# Patient Record
Sex: FEMALE | Race: WHITE | Employment: OTHER | URBAN - NONMETROPOLITAN AREA
[De-identification: names, ages, dates, MRNs, and addresses within clinical notes are randomized per-mention and may not be internally consistent; named-entity substitution may affect disease eponyms.]

---

## 2018-12-27 ENCOUNTER — OFFICE VISIT (OUTPATIENT)
Dept: FAMILY MEDICINE CLINIC | Age: 58
End: 2018-12-27
Payer: COMMERCIAL

## 2018-12-27 VITALS
TEMPERATURE: 98.4 F | RESPIRATION RATE: 18 BRPM | SYSTOLIC BLOOD PRESSURE: 124 MMHG | OXYGEN SATURATION: 94 % | HEART RATE: 82 BPM | BODY MASS INDEX: 32.85 KG/M2 | WEIGHT: 185.4 LBS | DIASTOLIC BLOOD PRESSURE: 72 MMHG | HEIGHT: 63 IN

## 2018-12-27 DIAGNOSIS — J02.0 STREP THROAT: Primary | ICD-10-CM

## 2018-12-27 LAB — STREPTOCOCCUS A RNA: NORMAL

## 2018-12-27 PROCEDURE — 87651 STREP A DNA AMP PROBE: CPT | Performed by: NURSE PRACTITIONER

## 2018-12-27 PROCEDURE — 99203 OFFICE O/P NEW LOW 30 MIN: CPT | Performed by: NURSE PRACTITIONER

## 2018-12-27 RX ORDER — DIPHENOXYLATE HYDROCHLORIDE AND ATROPINE SULFATE 2.5; .025 MG/1; MG/1
TABLET ORAL
COMMUNITY
Start: 2018-10-05

## 2018-12-27 RX ORDER — BUTALBITAL, ACETAMINOPHEN AND CAFFEINE 50; 325; 40 MG/1; MG/1; MG/1
TABLET ORAL
COMMUNITY
Start: 2018-10-05

## 2018-12-27 RX ORDER — AZITHROMYCIN 500 MG/1
500 TABLET, FILM COATED ORAL DAILY
Qty: 5 TABLET | Refills: 0 | Status: SHIPPED | OUTPATIENT
Start: 2018-12-27 | End: 2019-01-01

## 2018-12-27 ASSESSMENT — PATIENT HEALTH QUESTIONNAIRE - PHQ9: DEPRESSION UNABLE TO ASSESS: PT REFUSES

## 2019-01-01 ASSESSMENT — ENCOUNTER SYMPTOMS
SORE THROAT: 1
GASTROINTESTINAL NEGATIVE: 1
SHORTNESS OF BREATH: 0
COUGH: 0
EYES NEGATIVE: 1

## 2021-11-24 ENCOUNTER — HOSPITAL ENCOUNTER (EMERGENCY)
Age: 61
Discharge: HOME | End: 2021-11-25
Attending: EMERGENCY MEDICINE
Payer: COMMERCIAL

## 2021-11-24 DIAGNOSIS — U07.1 COVID-19 VIRUS INFECTION: Primary | ICD-10-CM

## 2021-11-24 PROCEDURE — 99283 EMERGENCY DEPT VISIT LOW MDM: CPT

## 2021-11-24 RX ORDER — PANTOPRAZOLE SODIUM 40 MG/1
40 GRANULE, DELAYED RELEASE ORAL
COMMUNITY

## 2021-11-24 ASSESSMENT — PAIN DESCRIPTION - FREQUENCY: FREQUENCY: CONTINUOUS

## 2021-11-24 ASSESSMENT — PAIN DESCRIPTION - DESCRIPTORS: DESCRIPTORS: PRESSURE

## 2021-11-24 ASSESSMENT — PAIN DESCRIPTION - LOCATION: LOCATION: HEAD

## 2021-11-24 ASSESSMENT — PAIN SCALES - GENERAL: PAINLEVEL_OUTOF10: 9

## 2021-11-25 ENCOUNTER — APPOINTMENT (OUTPATIENT)
Dept: GENERAL RADIOLOGY | Age: 61
End: 2021-11-25
Payer: COMMERCIAL

## 2021-11-25 VITALS
RESPIRATION RATE: 19 BRPM | HEIGHT: 63 IN | SYSTOLIC BLOOD PRESSURE: 128 MMHG | TEMPERATURE: 100.2 F | BODY MASS INDEX: 35.44 KG/M2 | OXYGEN SATURATION: 95 % | HEART RATE: 104 BPM | WEIGHT: 200 LBS | DIASTOLIC BLOOD PRESSURE: 81 MMHG

## 2021-11-25 LAB
FLU A ANTIGEN: NEGATIVE
FLU B ANTIGEN: NEGATIVE
GROUP A STREP CULTURE, REFLEX: NEGATIVE
REFLEX THROAT C + S: NORMAL
SARS-COV-2, NAAT: DETECTED

## 2021-11-25 PROCEDURE — 87070 CULTURE OTHR SPECIMN AEROBIC: CPT

## 2021-11-25 PROCEDURE — 87880 STREP A ASSAY W/OPTIC: CPT

## 2021-11-25 PROCEDURE — 87635 SARS-COV-2 COVID-19 AMP PRB: CPT

## 2021-11-25 PROCEDURE — 6370000000 HC RX 637 (ALT 250 FOR IP): Performed by: EMERGENCY MEDICINE

## 2021-11-25 PROCEDURE — 71045 X-RAY EXAM CHEST 1 VIEW: CPT

## 2021-11-25 PROCEDURE — 87804 INFLUENZA ASSAY W/OPTIC: CPT

## 2021-11-25 RX ORDER — ACETAMINOPHEN 325 MG/1
650 TABLET ORAL ONCE
Status: COMPLETED | OUTPATIENT
Start: 2021-11-25 | End: 2021-11-25

## 2021-11-25 RX ORDER — ALBUTEROL SULFATE 90 UG/1
2 AEROSOL, METERED RESPIRATORY (INHALATION) 4 TIMES DAILY PRN
Qty: 18 G | Refills: 1 | Status: SHIPPED | OUTPATIENT
Start: 2021-11-25

## 2021-11-25 RX ADMIN — ACETAMINOPHEN 650 MG: 325 TABLET ORAL at 00:16

## 2021-11-25 NOTE — ED NOTES
ED nurse-to-nurse bedside report    Chief Complaint   Patient presents with    Concern For COVID-19      LOC: alert and orientated to name, place, date  Vital signs   Vitals:    11/24/21 2350 11/25/21 0116   BP: (!) 155/69 128/81   Pulse: 111 104   Resp: 16 19   Temp: 100.2 °F (37.9 °C)    TempSrc: Oral    SpO2: 94% 95%   Weight: 200 lb (90.7 kg)    Height: 5' 3\" (1.6 m)       Pain:    Pain Interventions: none  Pain Goal: none  Oxygen: No    Current needs required reevaluated by provider   Telemetry: No  LDAs:    Continuous Infusions:   Mobility: Independent  Oneal Fall Risk Score: No flowsheet data found.   Fall Interventions: call light in reach  Report given to: [facility], [address] [place] [address]  11/25/21 0795

## 2021-11-25 NOTE — ED PROVIDER NOTES
[address] ENCOUNTER      PATIENT NAME: Roman Ingram  MRN: [id number]  : 1960  WAY: 2021  PROVIDER: Og Rios MD      [address] [place] [address]       Chief Complaint   Patient presents with    Concern For COVID-19       Patient is seen and evaluated in a timely fashion. Nurses Notes are reviewed and I agree except as noted in the HPI. HISTORY OF PRESENT ILLNESS    Brandon Aguiar is a 57 y.o. female who presents to Emergency Department with Concern For COVID-23    21-year-old female with no major past medical history presents to ED for evaluation of viral illness including headache, nasal congestion, sore throat, and body aches for 3 days. She states history of strep pharyngitis before. She has no fever or chills, however her temperature was 100.2 in ED. She is slightly tachycardic. No shortness of breath. No nausea or vomiting. No abdominal pain. No flank pain. No urinary symptoms. She received 2 dosage of Covid-19 vaccinations    This HPI was provided by patient.      REVIEW OF SYSTEMS   Ten-point review of systems is negative except those documented in above HPI including constitutional, HEENT, respiratory, cardiovascular, gastrointestinal, genitourinary, musculoskeletal, skin, neurological, hematological and behavioral.      PAST MEDICAL HISTORY     Past Medical History:   Diagnosis Date    Tachycardia 2019       SURGICAL HISTORY       Past Surgical History:   Procedure Laterality Date    APPENDECTOMY      HYSTERECTOMY, TOTAL ABDOMINAL      TUBAL LIGATION         CURRENT MEDICATIONS       Discharge Medication List as of 2021  2:10 AM      CONTINUE these medications which have NOT CHANGED    Details   pantoprazole sodium (PROTONIX) 40 MG PACK packet Take 40 mg by mouth every morning (before breakfast)Historical Med      butalbital-acetaminophen-caffeine (FIORICET, ESGIC) -40 MG per tablet Take by mouthHistorical Med diphenoxylate-atropine (LOMOTIL) 2.5-0.025 MG per tablet Take by mouth. .Historical Med             ALLERGIES     Latex and Penicillins    FAMILY HISTORY     She indicated that her mother is alive. She indicated that her father is alive. family history includes No Known Problems in her father and mother. SOCIAL HISTORY      reports that she has quit smoking. She has never used smokeless tobacco. She reports current alcohol use. She reports that she does not use drugs. PHYSICAL EXAM      height is 5' 3\" (1.6 m) and weight is 200 lb (90.7 kg). Her oral temperature is 100.2 °F (37.9 °C). Her blood pressure is 128/81 and her pulse is 104. Her respiration is 19 and oxygen saturation is 95%. Physical Exam  Vitals and nursing note reviewed. Constitutional:       Appearance: She is well-developed. She is not diaphoretic. HENT:      Head: Normocephalic and atraumatic. Nose: Congestion present. Mouth/Throat:      Pharynx: Posterior oropharyngeal erythema present. No oropharyngeal exudate. Eyes:      General: No scleral icterus. Right eye: No discharge. Left eye: No discharge. Conjunctiva/sclera: Conjunctivae normal.      Pupils: Pupils are equal, round, and reactive to light. Neck:      Vascular: No JVD. Trachea: No tracheal deviation. Cardiovascular:      Rate and Rhythm: Regular rhythm. Tachycardia present. Heart sounds: Normal heart sounds. No murmur heard. No friction rub. No gallop. Pulmonary:      Effort: Pulmonary effort is normal. No respiratory distress. Breath sounds: Normal breath sounds. No stridor. No wheezing or rales. Chest:      Chest wall: No tenderness. Abdominal:      General: Bowel sounds are normal. There is no distension. Palpations: Abdomen is soft. There is no mass. Tenderness: There is no abdominal tenderness. There is no guarding or rebound. Hernia: No hernia is present.    Musculoskeletal:         General: No SIGNS  Vitals:    11/24/21 2350 11/25/21 0116   BP: (!) 155/69 128/81   Pulse: 111 104   Resp: 16 19   Temp: 100.2 °F (37.9 °C)    TempSrc: Oral    SpO2: 94% 95%   Weight: 200 lb (90.7 kg)    Height: 5' 3\" (1.6 m)        MDM  Medications   acetaminophen (TYLENOL) tablet 650 mg (650 mg Oral Given 11/25/21 0016)     She has Covid-19 infection. Chest x-ray is clear. No hypoxia during relation. No indication for admission at present. I recommended supportive care including vitamin C, vitamin D, zinc, and albuterol inhaler. Over-the-counter Tylenol as needed for pain or fever. I do not recommend corticosteroid. I recommend discussing with PCP to consider Regeneron infusion. Discharged in stable condition with detailed instructions regarding warning signs that warrant coming back to ED for reevaluation or even admission.     CRITICAL CARE   None    CONSULTS   None    PROCEDURES   None    FINAL IMPRESSION AND DISPOSITION      1. COVID-19 virus infection        DISPOSITION Decision To Discharge 11/25/2021 02:03:34 AM        PATIENT REFERRED TO:  Ruth Munroe MD  [facility] [address] [place] [address]  [phone]    In 1 week  ED discharge follow up      [address]:  Discharge Medication List as of 11/25/2021  2:10 AM      START taking these medications    Details   albuterol sulfate HFA (VENTOLIN HFA) 108 (90 Base) MCG/ACT inhaler Inhale 2 puffs into the lungs 4 times daily as needed for Wheezing, Disp-18 g, R-1Normal             (Please note that portions of this note were completed with a voice recognition program.  Efforts were made to edit the dictations but occasionally words aremis-transcribed.)    MD Thanh Tracy MD  11/25/21 7870

## 2021-11-25 NOTE — ED TRIAGE NOTES
Pt comes to ED with complaints of COVID symptoms that started with a sore throat 3 days ago. Pt states that she wants to come get a COVID test. PT states she has HA that is 9/10 pain that feels like sinus pressure. Pt respers are regular at this time. Pt has a fever and states that she has not been able to break it, even taking tylenol. Pt has hx of strept throat.

## 2021-11-26 ENCOUNTER — CARE COORDINATION (OUTPATIENT)
Dept: CARE COORDINATION | Age: 61
End: 2021-11-26

## 2021-11-27 LAB — THROAT/NOSE CULTURE: NORMAL

## 2021-11-29 ENCOUNTER — CARE COORDINATION (OUTPATIENT)
Dept: CARE COORDINATION | Age: 61
End: 2021-11-29

## 2021-11-29 NOTE — CARE COORDINATION
Patient contacted regarding COVID-19 risk, exposure, diagnosis, pulse oximeter ordered at discharge and monoclonal antibody infusion follow up. Discussed COVID-19 related testing which was available at this time. Test results were positive. Patient informed of results, if available? Yes. Ambulatory Care Manager contacted the patient by telephone to perform post discharge assessment. Call within 2 business days of discharge: Yes. Verified name and  with patient as identifiers. Provided introduction to self, and explanation of the CTN/ACM role, and reason for call due to risk factors for infection and/or exposure to COVID-19. Symptoms reviewed with patient who verbalized the following symptoms: fatigue and HA. Due to no new or worsening symptoms encounter was not routed to provider for escalation. Discussed follow-up appointments. If no appointment was previously scheduled, appointment scheduling offered: No.  [facility] follow up appointment(s): No future appointments. Non-[facility] follow up appointment(s):     Non-face-to-face services provided:  Obtained and reviewed discharge summary and/or continuity of care documents, COVID positive     Advance Care Planning:   Does patient have an Advance Directive:  not on file. Educated patient about risk for severe COVID-19 due to risk factors according to CDC guidelines. ACM reviewed discharge instructions, medical action plan and red flag symptoms with the patient who verbalized understanding. Discussed COVID vaccination status: Yes. Education provided on COVID-19 vaccination as appropriate. Discussed exposure protocols and quarantine with CDC Guidelines. Patient was given an opportunity to verbalize any questions and concerns and agrees to contact ACM or health care provider for questions related to their healthcare.     Reviewed and educated patient on any new and changed medications related to discharge diagnosis     Was patient discharged with a pulse oximeter? Yes Discussed and confirmed pulse oximeter discharge instructions and when to notify provider or seek emergency care. 96%      ACM provided contact information. No further follow-up call identified based on severity of symptoms and risk factors.